# Patient Record
Sex: FEMALE | Race: WHITE | Employment: OTHER | URBAN - METROPOLITAN AREA
[De-identification: names, ages, dates, MRNs, and addresses within clinical notes are randomized per-mention and may not be internally consistent; named-entity substitution may affect disease eponyms.]

---

## 2017-05-19 ENCOUNTER — OFFICE VISIT (OUTPATIENT)
Dept: CARDIOLOGY CLINIC | Age: 67
End: 2017-05-19

## 2017-05-19 VITALS
HEIGHT: 60 IN | HEART RATE: 60 BPM | DIASTOLIC BLOOD PRESSURE: 66 MMHG | BODY MASS INDEX: 29.25 KG/M2 | OXYGEN SATURATION: 98 % | WEIGHT: 149 LBS | SYSTOLIC BLOOD PRESSURE: 104 MMHG

## 2017-05-19 DIAGNOSIS — I48.91 ATRIAL FIBRILLATION, UNSPECIFIED TYPE (HCC): Primary | ICD-10-CM

## 2017-05-19 DIAGNOSIS — I10 ESSENTIAL HYPERTENSION: ICD-10-CM

## 2017-05-19 DIAGNOSIS — I50.32 CHRONIC DIASTOLIC CONGESTIVE HEART FAILURE (HCC): ICD-10-CM

## 2017-05-19 DIAGNOSIS — E78.2 MIXED HYPERLIPIDEMIA: ICD-10-CM

## 2017-05-19 DIAGNOSIS — Z79.899 ON AMIODARONE THERAPY: ICD-10-CM

## 2017-05-19 PROCEDURE — 99215 OFFICE O/P EST HI 40 MIN: CPT | Performed by: INTERNAL MEDICINE

## 2017-05-19 PROCEDURE — 93000 ELECTROCARDIOGRAM COMPLETE: CPT | Performed by: INTERNAL MEDICINE

## 2017-05-19 RX ORDER — ALENDRONATE SODIUM 70 MG/1
70 TABLET ORAL
COMMUNITY
End: 2021-09-16 | Stop reason: ALTCHOICE

## 2017-07-24 RX ORDER — AMIODARONE HYDROCHLORIDE 200 MG/1
TABLET ORAL
Qty: 90 TABLET | Refills: 0 | Status: SHIPPED | OUTPATIENT
Start: 2017-07-24 | End: 2017-12-01 | Stop reason: ALTCHOICE

## 2017-07-24 RX ORDER — LOSARTAN POTASSIUM 100 MG/1
TABLET ORAL
Qty: 45 TABLET | Refills: 0 | Status: SHIPPED | OUTPATIENT
Start: 2017-07-24 | End: 2017-12-01 | Stop reason: SDUPTHER

## 2017-07-27 ENCOUNTER — HOSPITAL ENCOUNTER (OUTPATIENT)
Dept: PULMONOLOGY | Age: 67
Discharge: HOME | End: 2017-07-27
Attending: INTERNAL MEDICINE | Admitting: INTERNAL MEDICINE

## 2017-07-27 DIAGNOSIS — Z12.31 VISIT FOR SCREENING MAMMOGRAM: ICD-10-CM

## 2017-07-27 DIAGNOSIS — Z79.899 OTHER LONG TERM (CURRENT) DRUG THERAPY: ICD-10-CM

## 2017-07-27 PROCEDURE — 94060 EVALUATION OF WHEEZING: CPT | Performed by: INTERNAL MEDICINE

## 2017-07-27 PROCEDURE — 94727 GAS DIL/WSHOT DETER LNG VOL: CPT | Performed by: INTERNAL MEDICINE

## 2017-07-27 PROCEDURE — 94729 DIFFUSING CAPACITY: CPT | Performed by: INTERNAL MEDICINE

## 2017-07-27 RX ORDER — ALBUTEROL SULFATE 90 UG/1
4 AEROSOL, METERED RESPIRATORY (INHALATION) ONCE
Status: COMPLETED | OUTPATIENT
Start: 2017-07-27 | End: 2017-07-27

## 2017-07-27 RX ADMIN — ALBUTEROL SULFATE 4 PUFF: 90 AEROSOL, METERED RESPIRATORY (INHALATION) at 08:32

## 2017-10-16 RX ORDER — LOSARTAN POTASSIUM 100 MG/1
TABLET ORAL
Qty: 45 TABLET | Refills: 3 | Status: SHIPPED | OUTPATIENT
Start: 2017-10-16

## 2017-12-01 ENCOUNTER — OFFICE VISIT (OUTPATIENT)
Dept: CARDIOLOGY CLINIC | Age: 67
End: 2017-12-01

## 2017-12-01 VITALS
SYSTOLIC BLOOD PRESSURE: 106 MMHG | DIASTOLIC BLOOD PRESSURE: 66 MMHG | HEIGHT: 60 IN | OXYGEN SATURATION: 98 % | BODY MASS INDEX: 27.88 KG/M2 | HEART RATE: 88 BPM | WEIGHT: 142 LBS

## 2017-12-01 DIAGNOSIS — E78.2 MIXED HYPERLIPIDEMIA: ICD-10-CM

## 2017-12-01 DIAGNOSIS — I50.32 CHRONIC DIASTOLIC CONGESTIVE HEART FAILURE (HCC): ICD-10-CM

## 2017-12-01 DIAGNOSIS — I10 ESSENTIAL HYPERTENSION: Primary | ICD-10-CM

## 2017-12-01 DIAGNOSIS — I48.0 PAF (PAROXYSMAL ATRIAL FIBRILLATION) (HCC): ICD-10-CM

## 2017-12-01 PROCEDURE — 93000 ELECTROCARDIOGRAM COMPLETE: CPT | Performed by: INTERNAL MEDICINE

## 2017-12-01 PROCEDURE — 99214 OFFICE O/P EST MOD 30 MIN: CPT | Performed by: INTERNAL MEDICINE

## 2017-12-01 NOTE — PROGRESS NOTES
levothyroxine (SYNTHROID) 75 MCG tablet Take 75 mcg by mouth Daily      furosemide (LASIX) 40 MG tablet Take 40 mg by mouth 3 times daily      carvedilol (COREG) 6.25 MG tablet Take 6.25 mg by mouth 2 times daily (with meals).  potassium chloride SA (K-DUR;KLOR-CON M) 20 MEQ tablet TAKE 1 BY MOUTH AS DIRECTED EVERY DAY 90 tablet 3    spironolactone (ALDACTONE) 25 MG tablet TAKE 1 TABLET BY MOUTH EVERY DAY 90 tablet 3    nystatin (MYCOSTATIN) 573425 UNIT/ML suspension Take 500,000 Units by mouth 4 times daily.  Loratadine (CLARITIN) 10 MG CAPS Take  by mouth.  oxybutynin (DITROPAN-XL) 5 MG CR tablet Take 5 mg by mouth 3 times daily.  vitamin D (CHOLECALCIFEROL) 1000 UNIT TABS tablet Take 2,000 Units by mouth daily       ranitidine (ZANTAC) 300 MG capsule Take 300 mg by mouth 2 times daily.  atorvastatin (LIPITOR) 10 MG tablet Take 10 mg by mouth daily.  silver sulfADIAZINE (SILVADENE) 1 % cream Apply 1 applicator topically daily. Apply topically daily. No current facility-administered medications for this visit. Review of Systems:  Review of systems is as detailed above and all other systems are normal.     Physical Exam:   /66   Pulse 88   Ht 5' (1.524 m)   Wt 142 lb (64.4 kg) Comment: did not wish to remove shoes  SpO2 98%   BMI 27.73 kg/m²   Wt Readings from Last 3 Encounters:   12/01/17 142 lb (64.4 kg)   05/19/17 149 lb (67.6 kg)   12/02/16 143 lb 9.6 oz (65.1 kg)     Constitutional: She is oriented to person, place, and time. She appears well-developed and well-nourished. In no acute distress. Head: Normocephalic and atraumatic. Pupils equal and round. Neck: Neck supple. No JVP or carotid bruit appreciated. No mass and no thyromegaly present. No lymphadenopathy present. Cardiovascular:  Irregularly irregular. Exam reveals no gallop and no friction rub. No murmur heard.   Pulmonary/Chest: Effort normal and breath sounds normal. No respiratory distress. She has no wheezes, rhonchi or rales. Abdominal: Soft, non-tender. Bowel sounds are normal. She exhibits no organomegaly, mass or bruit. Extremities: No edema, cyanosis, or clubbing. Pulses are 2+ radial/dorsalis pedis/posterior tibial/carotid bilaterally. Neurological: No gross cranial nerve deficit. Coordination normal.   Skin: Skin is warm and dry. There is no rash or diaphoresis. Psychiatric: She has a normal mood and affect. Her speech is normal and behavior is normal.     Lab Review:   FLP: 7/2016 ([facility]) , TG 88, LDL 92, HDL 54  BUN/Creatinine: 7/2016  Lab Results   Component Value Date    BUN 13 04/20/2014    CREATININE 0.9 04/20/2014     EKG Interpretation: 5/19/17- S bradycardia     12/1/17-atrial fibrillation CVR  Image Review:     PFT 7/2017  Overall Interpretation  No obstruction is present  No restriction is present  There is not a bronchodilator response present  Diffusion capacity is normal  FEV1 is 2.07 L at 110% predicted. FEV1/FVC ratio is 73  Normal study.  No evidence to suggest toxicity due to amiodarone    Echo 5/2010  LVEF 50-55  Mild mitral regurgitation  Mild mitral annujlar calcification  Mild tricuspid regurgitation with normal RVSP 30mmHg    Echo 7/2007  EF 25-30%    Assessment/Plan:     Hypertension  BP is normal today. BMP from 10/2017 stable. Hyperlipidemia  Last lipid profile from 8/4/17 was WNL. Atrial fibrillation [facility])  Patient is in atrial fibrillation and this is confirmed by EKG today. I will discontinue her  of Amiodarone. Patient is on chronic anticoagulation therapy. She may switch to Xarelto or Eliquis if it is not cost prohibitive. PFT and TSH from 7/2017 was normal.     CHF (congestive heart failure) ([address]  Patient denies dyspnea on exertion, orthopnea, PND and  leg edema. Patient is euvolemic on clinical exam today. Renal profile from 10/5/2017 was within normal limits. NYHA class 2    Patient to follow up 6 months.  Pt already had her flu vaccine. Thank you very much for allowing me to participate in the care of your patient. Please do not hesitate to contact me if you have any questions.     Sincerely,  Christopher Gonzalez MD      [address]  Ph: [phone]  Fax: [phone]

## 2017-12-01 NOTE — PATIENT INSTRUCTIONS
Patient Education        Atrial Fibrillation: Care Instructions  Your Care Instructions    Atrial fibrillation is an irregular and often fast heartbeat. Treating this condition is important for several reasons. It can cause blood clots, which can travel from your heart to your brain and cause a stroke. If you have a fast heartbeat, you may feel lightheaded, dizzy, and weak. An irregular heartbeat can also increase your risk for heart failure. Atrial fibrillation is often the result of another heart condition, such as high blood pressure or coronary artery disease. Making changes to improve your heart condition will help you stay healthy and active. Follow-up care is a key part of your treatment and safety. Be sure to make and go to all appointments, and call your doctor if you are having problems. It's also a good idea to know your test results and keep a list of the medicines you take. How can you care for yourself at home? Medicines  · Take your medicines exactly as prescribed. Call your doctor if you think you are having a problem with your medicine. You will get more details on the specific medicines your doctor prescribes. · If your doctor has given you a blood thinner to prevent a stroke, be sure you get instructions about how to take your medicine safely. Blood thinners can cause serious bleeding problems. · Do not take any vitamins, over-the-counter drugs, or herbal products without talking to your doctor first.  Lifestyle changes  · Do not smoke. Smoking can increase your chance of a stroke and heart attack. If you need help quitting, talk to your doctor about stop-smoking programs and medicines. These can increase your chances of quitting for good. · Eat a heart-healthy diet. · Stay at a healthy weight. Lose weight if you need to. · Limit alcohol to 2 drinks a day for men and 1 drink a day for women. Too much alcohol can cause health problems. · Avoid colds and flu.  Get a pneumococcal vaccine include:  ¨ Sudden numbness, tingling, weakness, or loss of movement in your face, arm, or leg, especially on only one side of your body. ¨ Sudden vision changes. ¨ Sudden trouble speaking. ¨ Sudden confusion or trouble understanding simple statements. ¨ Sudden problems with walking or balance. ¨ A sudden, severe headache that is different from past headaches. · You passed out (lost consciousness). Call your doctor now or seek immediate medical care if:  · You have new or increased shortness of breath. · You feel dizzy or lightheaded, or you feel like you may faint. · Your heart rate becomes irregular. · You can feel your heart flutter in your chest or skip heartbeats. Tell your doctor if these symptoms are new or worse. Watch closely for changes in your health, and be sure to contact your doctor if you have any problems. Where can you learn more? Go to https://Ninja MetricspeNanoSteel.Fitness Interactive Experience. org and sign in to your Qu[employer] account. Enter U020 in the [facility] box to learn more about \"Atrial Fibrillation: Care Instructions. \"     If you do not have an account, please click on the \"Sign Up Now\" link. Current as of: September 21, 2016  Content Version: 11.3  © 6436-3291 [employer], viseto. Care instructions adapted under license by [facility] ([facility]). If you have questions about a medical condition or this instruction, always ask your healthcare professional. [place] [address] any warranty or liability for your use of this information.

## 2017-12-05 ENCOUNTER — TELEPHONE (OUTPATIENT)
Dept: CARDIOLOGY CLINIC | Age: 67
End: 2017-12-05

## 2017-12-05 RX ORDER — AMIODARONE HYDROCHLORIDE 200 MG/1
100 TABLET ORAL DAILY
COMMUNITY
End: 2018-06-22 | Stop reason: DRUGHIGH

## 2017-12-05 NOTE — TELEPHONE ENCOUNTER
Dr. Medhat Vazquez,     Patient was seen in office 12/1/17 and Amiodarone discontinued as per her EKG she was back in Afib CVR. She did call the on call last evening and was instructed to take 1/2 tablet(100mg). Would you like her to continue Amiodarone 100 mg daily and maybe evaluate her rate with a holter monitor? She is on no other rate control medications. Thank you.

## 2017-12-05 NOTE — TELEPHONE ENCOUNTER
Suzanne Espinoza called in stating at her OV ov 12/1 Dr. Albino Roth stopped her Amiodarone. She states she was taking Amiodarone 200 mg and cutting the tablet in half. She was off of it Saturday Sunday and took one Monday because she wasn't feeling well. She wanted to let Dr. Albino Roth know she doesn't feel comfortable discontinuing the Amiodarone. You can reach Suzanne Espinoza at [address] says she will be out this afternoon and to please leave a voicemail if she doesn't answer.

## 2017-12-05 NOTE — TELEPHONE ENCOUNTER
Patient was in office on 12/1/17. Said she did not feel good. Could not explain. Not really SOB, maybe more labored. She is not sure if she felt bad since she was off the amiodarone or if it was some kind on anxiety attack. Called office last night, spoke with Dr. Debbie Escobedo on call. He told her that she could go ahead an take the 1/2 tablet. She did not take any today. She checked her vitals today on her sister's home monitor. Left arm /86 P 86; Right arm 121/69 P 82. She states her arms vary most of the time. O2 98. She said she feels better on amiodarone, but will do whatever Dr. Petrona Stallworth wants her to do.

## 2017-12-06 NOTE — TELEPHONE ENCOUNTER
Attempted to call patient again. Left another message as requested. OK to restart amiodarone. Requested call back to confirm.

## 2018-01-08 RX ORDER — AMIODARONE HYDROCHLORIDE 200 MG/1
TABLET ORAL
Qty: 90 TABLET | Refills: 5 | Status: SHIPPED | OUTPATIENT
Start: 2018-01-08 | End: 2018-06-22 | Stop reason: ALTCHOICE

## 2018-06-22 ENCOUNTER — OFFICE VISIT (OUTPATIENT)
Dept: CARDIOLOGY CLINIC | Age: 68
End: 2018-06-22

## 2018-06-22 VITALS
HEART RATE: 88 BPM | BODY MASS INDEX: 29.56 KG/M2 | SYSTOLIC BLOOD PRESSURE: 104 MMHG | DIASTOLIC BLOOD PRESSURE: 60 MMHG | HEIGHT: 57 IN | WEIGHT: 137 LBS | OXYGEN SATURATION: 98 %

## 2018-06-22 DIAGNOSIS — E78.2 MIXED HYPERLIPIDEMIA: ICD-10-CM

## 2018-06-22 DIAGNOSIS — I48.91 ATRIAL FIBRILLATION, UNSPECIFIED TYPE (HCC): ICD-10-CM

## 2018-06-22 DIAGNOSIS — I10 ESSENTIAL HYPERTENSION: Primary | ICD-10-CM

## 2018-06-22 DIAGNOSIS — I50.22 CHRONIC SYSTOLIC CONGESTIVE HEART FAILURE (HCC): ICD-10-CM

## 2018-06-22 PROCEDURE — 99214 OFFICE O/P EST MOD 30 MIN: CPT | Performed by: INTERNAL MEDICINE

## 2018-06-22 PROCEDURE — 93000 ELECTROCARDIOGRAM COMPLETE: CPT | Performed by: INTERNAL MEDICINE

## 2018-08-25 ENCOUNTER — HOSPITAL ENCOUNTER (OUTPATIENT)
Dept: WOMENS IMAGING | Age: 68
Discharge: HOME | End: 2018-08-25
Attending: FAMILY MEDICINE | Admitting: FAMILY MEDICINE

## 2018-08-25 DIAGNOSIS — Z12.31 VISIT FOR SCREENING MAMMOGRAM: ICD-10-CM

## 2019-02-08 ENCOUNTER — OFFICE VISIT (OUTPATIENT)
Dept: CARDIOLOGY CLINIC | Age: 69
End: 2019-02-08
Payer: COMMERCIAL

## 2019-02-08 VITALS
DIASTOLIC BLOOD PRESSURE: 60 MMHG | HEIGHT: 60 IN | OXYGEN SATURATION: 99 % | SYSTOLIC BLOOD PRESSURE: 106 MMHG | WEIGHT: 129 LBS | BODY MASS INDEX: 25.32 KG/M2 | HEART RATE: 84 BPM

## 2019-02-08 DIAGNOSIS — E78.2 MIXED HYPERLIPIDEMIA: ICD-10-CM

## 2019-02-08 DIAGNOSIS — I10 ESSENTIAL HYPERTENSION: ICD-10-CM

## 2019-02-08 DIAGNOSIS — I50.9 CONGESTIVE HEART FAILURE, UNSPECIFIED HF CHRONICITY, UNSPECIFIED HEART FAILURE TYPE (HCC): ICD-10-CM

## 2019-02-08 DIAGNOSIS — I48.91 ATRIAL FIBRILLATION, UNSPECIFIED TYPE (HCC): Primary | ICD-10-CM

## 2019-02-08 PROCEDURE — 93000 ELECTROCARDIOGRAM COMPLETE: CPT | Performed by: INTERNAL MEDICINE

## 2019-02-08 PROCEDURE — 99214 OFFICE O/P EST MOD 30 MIN: CPT | Performed by: INTERNAL MEDICINE

## 2019-04-02 ENCOUNTER — TELEPHONE (OUTPATIENT)
Dept: CARDIOLOGY CLINIC | Age: 69
End: 2019-04-02

## 2019-04-02 NOTE — TELEPHONE ENCOUNTER
Spoke with Jenny Velasco and she was asking some questions about the ablation that was discussed with Dr Stephanie Patel in her last office visit. I encouraged her to make an appointment with Dr Anam Cueva in order to get all of her questions answered completely. She wants to check with her insurance first. She says that she will call back if she wants to make the appointment with Dr Anam Cueva.

## 2019-08-23 ENCOUNTER — OFFICE VISIT (OUTPATIENT)
Dept: CARDIOLOGY CLINIC | Age: 69
End: 2019-08-23
Payer: COMMERCIAL

## 2019-08-23 VITALS
HEART RATE: 74 BPM | HEIGHT: 58 IN | WEIGHT: 116 LBS | SYSTOLIC BLOOD PRESSURE: 102 MMHG | OXYGEN SATURATION: 97 % | BODY MASS INDEX: 24.35 KG/M2 | DIASTOLIC BLOOD PRESSURE: 62 MMHG

## 2019-08-23 DIAGNOSIS — E78.2 MIXED HYPERLIPIDEMIA: ICD-10-CM

## 2019-08-23 DIAGNOSIS — I48.91 ATRIAL FIBRILLATION, UNSPECIFIED TYPE (HCC): Primary | ICD-10-CM

## 2019-08-23 DIAGNOSIS — I10 ESSENTIAL HYPERTENSION: ICD-10-CM

## 2019-08-23 DIAGNOSIS — I50.9 CONGESTIVE HEART FAILURE, UNSPECIFIED HF CHRONICITY, UNSPECIFIED HEART FAILURE TYPE (HCC): ICD-10-CM

## 2019-08-23 PROCEDURE — 99214 OFFICE O/P EST MOD 30 MIN: CPT | Performed by: INTERNAL MEDICINE

## 2019-08-23 PROCEDURE — 93000 ELECTROCARDIOGRAM COMPLETE: CPT | Performed by: INTERNAL MEDICINE

## 2019-08-23 RX ORDER — WARFARIN SODIUM 2.5 MG/1
2.5 TABLET ORAL DAILY
COMMUNITY

## 2020-03-12 NOTE — PROGRESS NOTES
[address]      Cardiology Follow Up    Kevin Thomas  1950 March 13, 2020    CC \"I am doing good. \"    HPI:  The patient is 78 y.o. female with a past medical history significant for Afib, CHF and HTN. Today, she is here for management of her atrial fibrillation. She says that she is feeling good. She is trying to stay as active as she can. Patient denies exertional chest pain/pressure, dyspnea at rest, BATISTA, PND, orthopnea, palpitations, lightheadedness, weight changes, changes in LE edema, and syncope. Patient reports compliance to her medications.     Past Medical History:   Diagnosis Date    [facility]     Cardiomyopathy [facility]     Hyperlipidemia     Hypertension     Osteoarthritis      Past Surgical History:   Procedure Laterality Date    CHOLECYSTECTOMY      HERNIA REPAIR       Family History   Problem Relation Age of Onset    High Blood Pressure Father      Social History     Tobacco Use    Smoking status: Never Smoker    Smokeless tobacco: Never Used   Substance Use Topics    Alcohol use: No    Drug use: No       Allergies   Allergen Reactions    Latex     Plasticized Base [Plastibase]     Adhesive Tape     Erythromycin     Merthiolate Glycerite [Thimerosal]     Sulfamethoxazole-Trimethoprim Rash     Oral thrush     Current Outpatient Medications   Medication Sig Dispense Refill    warfarin (COUMADIN) 2.5 MG tablet Take 2.5 mg by mouth Twice a week per PCP Dr. Kyara Ruiz      losartan (COZAAR) 100 MG tablet TAKE ONE-HALF TABLET BY MOUTH EVERY DAY 45 tablet 3    alendronate (FOSAMAX) 70 MG tablet Take 70 mg by mouth every 7 days      Bisacodyl (LAXATIVE PO) Take by mouth nightly as needed      warfarin (COUMADIN) 3 MG tablet Take by mouth daily Five times a week      levothyroxine (SYNTHROID) 75 MCG tablet Take 75 mcg by mouth Daily      furosemide (LASIX) 40 MG tablet Take 40 mg by mouth 3 times daily      carvedilol (COREG) 6.25 MG gross cranial nerve deficit. Coordination normal.   Skin: Skin is warm and dry. There is no rash or diaphoresis. Psychiatric: Patient has a normal mood and affect. Speech is normal and behavior is normal.     Lab Review:   BMP:    Lab Results   Component Value Date     04/20/2014    K 3.8 04/20/2014     04/20/2014    CO2 24 04/20/2014    BUN 13 04/20/2014    LABALBU 4.1 04/20/2014    CREATININE 0.9 04/20/2014    CALCIUM 9.5 04/20/2014    GFRAA >60 04/20/2014    LABGLOM >60 04/20/2014    GLUCOSE 98 04/20/2014     FLP:  No results found for: TRIG, HDL, LDLCALC, LDLDIRECT, LABVLDL      EKG Interpretation: 8/23/19 Atrial fibrillation      Image Review:     PFT 7/2017  Overall Interpretation  No obstruction is present  No restriction is present  There is not a bronchodilator response present  Diffusion capacity is normal  FEV1 is 2.07 L at 110% predicted. FEV1/FVC ratio is 73  Normal study.  No evidence to suggest toxicity due to amiodarone    Echo 5/2010  LVEF 50-55  Mild mitral regurgitation  Mild mitral annujlar calcification  Mild tricuspid regurgitation with normal RVSP 30mmHg    Echo 7/2007  EF 25-30%    Assessment/Plan:     Hypertension  Blood pressure is stable. BMP from 8/2019 stable. Hyperlipidemia  Last lipid profile from 8/9/19 was WNL. Continue Lipitor 10 mg daily. She will get her lipid panel repeat at her PCP in April. Atrial fibrillation ([address]  EKG shows controlled atrial fibrillation today. She remains asymptomatic. Patient is on chronic anticoagulation therapy. INR managed by Dr. Flavia Thomas. CHF (congestive heart failure) ([address]  She denies shortness of breath, orthopnea, or PND. Renal profile from 8/2019 was within normal limits. NYHA class 2    Follow up in 6 months. She had her flu vaccine this season. Thank you very much for allowing me to participate in the care of your patient. Please do not hesitate to contact me if you have any questions.     This note was scribed in the presence of Dr Cliff Osullivan, by Luci Stewart RN  Physician Attestation:  The scribes documentation has been prepared under my direction and personally reviewed by me in its entirety. I confirm that the note above accurately reflects all work, treatment, procedures, and medical decision making performed by me.     Sincerely,  Fanny Odom MD      [address]   [address] [place] [address]  Ph: [phone]  Fax: [phone]

## 2020-03-13 ENCOUNTER — OFFICE VISIT (OUTPATIENT)
Dept: CARDIOLOGY CLINIC | Age: 70
End: 2020-03-13
Payer: COMMERCIAL

## 2020-03-13 VITALS
SYSTOLIC BLOOD PRESSURE: 110 MMHG | WEIGHT: 118 LBS | HEART RATE: 86 BPM | BODY MASS INDEX: 23.16 KG/M2 | OXYGEN SATURATION: 97 % | DIASTOLIC BLOOD PRESSURE: 64 MMHG | HEIGHT: 60 IN

## 2020-03-13 PROCEDURE — 99214 OFFICE O/P EST MOD 30 MIN: CPT | Performed by: INTERNAL MEDICINE

## 2020-03-13 PROCEDURE — 93000 ELECTROCARDIOGRAM COMPLETE: CPT | Performed by: INTERNAL MEDICINE

## 2020-07-28 ENCOUNTER — HOSPITAL ENCOUNTER (OUTPATIENT)
Dept: WOMENS IMAGING | Age: 70
Discharge: HOME | End: 2020-07-28
Payer: MEDICARE

## 2020-07-28 PROCEDURE — 77067 SCR MAMMO BI INCL CAD: CPT

## 2020-09-25 ENCOUNTER — OFFICE VISIT (OUTPATIENT)
Dept: CARDIOLOGY CLINIC | Age: 70
End: 2020-09-25
Payer: MEDICARE

## 2020-09-25 VITALS
BODY MASS INDEX: 23.39 KG/M2 | WEIGHT: 116 LBS | OXYGEN SATURATION: 95 % | SYSTOLIC BLOOD PRESSURE: 108 MMHG | DIASTOLIC BLOOD PRESSURE: 68 MMHG | HEART RATE: 84 BPM | TEMPERATURE: 98.2 F | HEIGHT: 59 IN

## 2020-09-25 PROCEDURE — 93000 ELECTROCARDIOGRAM COMPLETE: CPT | Performed by: INTERNAL MEDICINE

## 2020-09-25 PROCEDURE — 99214 OFFICE O/P EST MOD 30 MIN: CPT | Performed by: INTERNAL MEDICINE

## 2020-09-25 NOTE — PATIENT INSTRUCTIONS
Patient Education        Atrial Fibrillation: Care Instructions  Your Care Instructions     Atrial fibrillation is an irregular and often fast heartbeat. Treating this condition is important for several reasons. It can cause blood clots, which can travel from your heart to your brain and cause a stroke. If you have a fast heartbeat, you may feel lightheaded, dizzy, and weak. An irregular heartbeat can also increase your risk for heart failure. Atrial fibrillation is often the result of another heart condition, such as high blood pressure or coronary artery disease. Making changes to improve your heart condition will help you stay healthy and active. Follow-up care is a key part of your treatment and safety. Be sure to make and go to all appointments, and call your doctor if you are having problems. It's also a good idea to know your test results and keep a list of the medicines you take. How can you care for yourself at home? Medicines  · Take your medicines exactly as prescribed. Call your doctor if you think you are having a problem with your medicine. You will get more details on the specific medicines your doctor prescribes. · If your doctor has given you a blood thinner to prevent a stroke, be sure you get instructions about how to take your medicine safely. Blood thinners can cause serious bleeding problems. · Do not take any vitamins, over-the-counter drugs, or herbal products without talking to your doctor first.  Lifestyle changes  · Do not smoke. Smoking can increase your chance of a stroke and heart attack. If you need help quitting, talk to your doctor about stop-smoking programs and medicines. These can increase your chances of quitting for good. · Eat a heart-healthy diet. · Stay at a healthy weight. Lose weight if you need to. · Limit alcohol to 2 drinks a day for men and 1 drink a day for women. Too much alcohol can cause health problems. · Avoid colds and flu.  Get a pneumococcal vaccine shot. If you have had one before, ask your doctor whether you need another dose. Get a flu shot every year. If you must be around people with colds or flu, wash your hands often. Activity  · If your doctor recommends it, get more exercise. Walking is a good choice. Bit by bit, increase the amount you walk every day. Try for at least 30 minutes on most days of the week. You also may want to swim, bike, or do other activities. Your doctor may suggest that you join a cardiac rehabilitation program so that you can have help increasing your physical activity safely. · Start light exercise if your doctor says it is okay. Even a small amount will help you get stronger, have more energy, and manage stress. Walking is an easy way to get exercise. Start out by walking a little more than you did in the hospital. Gradually increase the amount you walk. · When you exercise, watch for signs that your heart is working too hard. You are pushing too hard if you cannot talk while you are exercising. If you become short of breath or dizzy or have chest pain, sit down and rest immediately. · Check your pulse regularly. Place two fingers on the artery at the palm side of your wrist, in line with your thumb. If your heartbeat seems uneven or fast, talk to your doctor. When should you call for help? [id number] anytime you think you may need emergency care. For example, call if:  · You have symptoms of a heart attack. These may include:  ? Chest pain or pressure, or a strange feeling in the chest.  ? Sweating. ? Shortness of breath. ? Nausea or vomiting. ? Pain, pressure, or a strange feeling in the back, neck, jaw, or upper belly or in one or both shoulders or arms. ? Lightheadedness or sudden weakness. ? A fast or irregular heartbeat. After you call 911, the  may tell you to chew 1 adult-strength or 2 to 4 low-dose aspirin. Wait for an ambulance. Do not try to drive yourself. · You have symptoms of a stroke.  These may include:  ? Sudden numbness, tingling, weakness, or loss of movement in your face, arm, or leg, especially on only one side of your body. ? Sudden vision changes. ? Sudden trouble speaking. ? Sudden confusion or trouble understanding simple statements. ? Sudden problems with walking or balance. ? A sudden, severe headache that is different from past headaches. · You passed out (lost consciousness). Call your doctor now or seek immediate medical care if:  · You have new or increased shortness of breath. · You feel dizzy or lightheaded, or you feel like you may faint. · Your heart rate becomes irregular. · You can feel your heart flutter in your chest or skip heartbeats. Tell your doctor if these symptoms are new or worse. Watch closely for changes in your health, and be sure to contact your doctor if you have any problems. Where can you learn more? Go to https://Ruralco HoldingspeOnlineprintersewUnveil.Qiyou Interaction Network. org and sign in to your [employer] account. Enter U020 in the [employer] box to learn more about \"Atrial Fibrillation: Care Instructions. \"     If you do not have an account, please click on the \"Sign Up Now\" link. Current as of: December 16, 2019               Content Version: 12.5  © 1417-4055 Healthwise, Incorporated. Care instructions adapted under license by [facility] ([facility]). If you have questions about a medical condition or this instruction, always ask your healthcare professional. [place] [address] any warranty or liability for your use of this information.

## 2020-09-25 NOTE — PROGRESS NOTES
[address]      Cardiology Follow Up    Gretchen Ellington  1950 September 25, 2020    CC \"I am feeling good. \"    HPI:  The patient is 69 y.o. female with a past medical history significant for Afib, CHF and HTN. Today, she is here for routine follow up. She says that she has been feeling good. She recently had labs done at her PCP. She continues to stay active without limitations. Patient denies exertional chest pain/pressure, dyspnea at rest, BATISTA, PND, orthopnea, palpitations, lightheadedness, weight changes, changes in LE edema, and syncope.       Past Medical History:   Diagnosis Date    Atrial fibrillation ([address]     Cardiomyopathy [facility] [address] [facility]     Hyperlipidemia     Hypertension     Osteoarthritis      Past Surgical History:   Procedure Laterality Date    CHOLECYSTECTOMY      HERNIA REPAIR       Family History   Problem Relation Age of Onset    High Blood Pressure Father      Social History     Tobacco Use    Smoking status: Never Smoker    Smokeless tobacco: Never Used   Substance Use Topics    Alcohol use: No    Drug use: No       Allergies   Allergen Reactions    Latex     Plasticized Base [Plastibase]     Adhesive Tape     Erythromycin     Merthiolate Glycerite [Thimerosal]     Sulfamethoxazole-Trimethoprim Rash     Oral thrush     Current Outpatient Medications   Medication Sig Dispense Refill    FAMOTIDINE PO Take by mouth 2 times daily      Acetaminophen (TYLENOL PO) Take by mouth as needed      warfarin (COUMADIN) 2.5 MG tablet Take 2.5 mg by mouth Twice a week per PCP Dr. Enrique Antonio      losartan (COZAAR) 100 MG tablet TAKE ONE-HALF TABLET BY MOUTH EVERY DAY 45 tablet 3    alendronate (FOSAMAX) 70 MG tablet Take 70 mg by mouth every 7 days      Bisacodyl (LAXATIVE PO) Take by mouth nightly as needed      warfarin (COUMADIN) 3 MG tablet Take by mouth daily Five times a week      levothyroxine (SYNTHROID) 75 MCG tablet Take 75 mcg by mouth Daily      furosemide (LASIX) 40 MG tablet Take 40 mg by mouth 3 times daily      carvedilol (COREG) 6.25 MG tablet Take 6.25 mg by mouth 2 times daily (with meals).  potassium chloride SA (K-DUR;KLOR-CON M) 20 MEQ tablet TAKE 1 BY MOUTH AS DIRECTED EVERY DAY 90 tablet 3    spironolactone (ALDACTONE) 25 MG tablet TAKE 1 TABLET BY MOUTH EVERY DAY 90 tablet 3    Loratadine (CLARITIN) 10 MG CAPS Take by mouth daily       oxybutynin (DITROPAN-XL) 5 MG CR tablet Take 5 mg by mouth 3 times daily.  vitamin D (CHOLECALCIFEROL) 1000 UNIT TABS tablet Take 2,000 Units by mouth daily       atorvastatin (LIPITOR) 10 MG tablet Take 10 mg by mouth daily.  silver sulfADIAZINE (SILVADENE) 1 % cream Apply 1 applicator topically daily. Apply topically daily. No current facility-administered medications for this visit. Review of Systems:  Review of systems is as detailed above and all other systems are normal.     Physical Exam:   /68   Pulse 84   Temp 98.2 °F (36.8 °C)   Ht 4' 11\" (1.499 m)   Wt 116 lb (52.6 kg) Comment: with shoes  SpO2 95%   BMI 23.43 kg/m²   Wt Readings from Last 3 Encounters:   09/25/20 116 lb (52.6 kg)   03/13/20 118 lb (53.5 kg)   08/23/19 116 lb (52.6 kg)     Constitutional: The patient is oriented to person, place, and time. Appears well-developed and well-nourished. In no acute distress. Head: Normocephalic and atraumatic. Pupils equal and round. Neck: Neck supple. No JVP or carotid bruit appreciated. No mass and no thyromegaly present. No lymphadenopathy present. Cardiovascular: Irregularly irregular. Normal heart sounds. Exam reveals no gallop and no friction rub. No murmur heard. Pulmonary/Chest: Effort normal and breath sounds normal. No respiratory distress. No wheezes, rhonchi or rales. Abdominal: Soft, non-tender. Bowel sounds are normal. Exhibits no organomegaly, mass or bruit. Extremities: No edema. No cyanosis or clubbing.  Pulses are 2+ radial and carotid bilaterally. Neurological: No gross cranial nerve deficit. Coordination normal.   Skin: Skin is warm and dry. There is no rash or diaphoresis. Psychiatric: Patient has a normal mood and affect. Speech is normal and behavior is normal.     Lab Review:   BMP:    Lab Results   Component Value Date     04/20/2014    K 3.8 04/20/2014     04/20/2014    CO2 24 04/20/2014    BUN 13 04/20/2014    LABALBU 4.1 04/20/2014    CREATININE 0.9 04/20/2014    CALCIUM 9.5 04/20/2014    GFRAA >60 04/20/2014    LABGLOM >60 04/20/2014    GLUCOSE 98 04/20/2014     FLP:  No results found for: TRIG, HDL, LDLCALC, LDLDIRECT, LABVLDL      EKG Interpretation: 8/23/19 Atrial fibrillation     9/25/2020 Atrial fibrillation      Image Review:     PFT 7/2017  Overall Interpretation  No obstruction is present  No restriction is present  There is not a bronchodilator response present  Diffusion capacity is normal  FEV1 is 2.07 L at 110% predicted. FEV1/FVC ratio is 73  Normal study.  No evidence to suggest toxicity due to amiodarone    Echo 5/2010  LVEF 50-55  Mild mitral regurgitation  Mild mitral annujlar calcification  Mild tricuspid regurgitation with normal RVSP 30mmHg    Echo 7/2007  EF 25-30%    Assessment/Plan:     Hypertension  Controlled. BMP from 9/2020 stable. Hyperlipidemia  Last lipid profile from 9/18/2020 was WNL. Continue Lipitor 10 mg daily. Atrial fibrillation (HCC)  EKG reveals controlled atrial fib. Asymptomatic. Patient is on chronic anticoagulation therapy. INR managed by Dr. Portia Mcgraw. I discussed with her the option of an ablation. She continues to continue her current treatment with Warfarin at this time. She will call the office if she wishes to proceed. CHF (congestive heart failure) (HCC)  Appears compensated on exShe denies shortness of breath, orthopnea, or PND. Continue ARB and B-blocker. NYHA class 2    Follow up in 6 months. I have advised her to get annual flu vaccine. Complexity of medical decision making-high    Thank you very much for allowing me to participate in the care of your patient. Please do not hesitate to contact me if you have any questions. Sincerely,  Edwin Ruff MD      [facility], [address]  Ph: [phone]  Fax: [phone]    This note was scribed in the presence of Dr Vane Johnson, by Lindsay Delgado RN  Physician Attestation:  The scribes documentation has been prepared under my direction and personally reviewed by me in its entirety. I confirm that the note above accurately reflects all work, treatment, procedures, and medical decision making performed by me.

## 2021-03-18 ENCOUNTER — OFFICE VISIT (OUTPATIENT)
Dept: CARDIOLOGY CLINIC | Age: 71
End: 2021-03-18
Payer: MEDICARE

## 2021-03-18 VITALS
WEIGHT: 120 LBS | BODY MASS INDEX: 25.19 KG/M2 | SYSTOLIC BLOOD PRESSURE: 114 MMHG | HEART RATE: 84 BPM | TEMPERATURE: 97.8 F | DIASTOLIC BLOOD PRESSURE: 66 MMHG | HEIGHT: 58 IN

## 2021-03-18 DIAGNOSIS — I50.9 CHRONIC HEART FAILURE, UNSPECIFIED HEART FAILURE TYPE (HCC): ICD-10-CM

## 2021-03-18 DIAGNOSIS — I48.0 PAF (PAROXYSMAL ATRIAL FIBRILLATION) (HCC): ICD-10-CM

## 2021-03-18 DIAGNOSIS — I10 ESSENTIAL HYPERTENSION: Primary | ICD-10-CM

## 2021-03-18 DIAGNOSIS — E78.5 HYPERLIPIDEMIA, UNSPECIFIED HYPERLIPIDEMIA TYPE: ICD-10-CM

## 2021-03-18 PROCEDURE — 99213 OFFICE O/P EST LOW 20 MIN: CPT | Performed by: INTERNAL MEDICINE

## 2021-03-18 PROCEDURE — 93000 ELECTROCARDIOGRAM COMPLETE: CPT | Performed by: INTERNAL MEDICINE

## 2021-03-18 NOTE — PROGRESS NOTES
[address]      Cardiology Follow Up    Brit Me  1950 March 18, 2021    CC \"I am feeling good with no heart symptoms. \"    HPI:  The patient is 69 y.o. female with a past medical history significant for Afib, CHF and HTN. Today, she denies any new cardiac sounding symptoms today. She has obtain the COVID vaccine. Patient denies exertional chest pain/pressure, dyspnea at rest, BATISTA, PND, orthopnea, palpitations, lightheadedness, weight changes, changes in LE edema, and syncope. She reports medical therapy compliance and tolerating. She denies any abnormal bruising or bleeding. Using a cane for walking aid.        Past Medical History:   Diagnosis Date    Atrial fibrillation ([address]     Cardiomyopathy ([facility] [address]     Hyperlipidemia     Hypertension     Osteoarthritis      Past Surgical History:   Procedure Laterality Date    CHOLECYSTECTOMY      HERNIA REPAIR       Family History   Problem Relation Age of Onset    High Blood Pressure Father      Social History     Tobacco Use    Smoking status: Never Smoker    Smokeless tobacco: Never Used   Substance Use Topics    Alcohol use: No    Drug use: No       Allergies   Allergen Reactions    Latex     Plasticized Base [Plastibase]     Adhesive Tape     Erythromycin     Merthiolate Glycerite [Thimerosal]     Sulfamethoxazole-Trimethoprim Rash     Oral thrush     Current Outpatient Medications   Medication Sig Dispense Refill    FAMOTIDINE PO Take by mouth 2 times daily      Acetaminophen (TYLENOL PO) Take by mouth as needed      warfarin (COUMADIN) 2.5 MG tablet Take 2.5 mg by mouth Twice a week per PCP Dr. Pravin Leung      losartan (COZAAR) 100 MG tablet TAKE ONE-HALF TABLET BY MOUTH EVERY DAY 45 tablet 3    alendronate (FOSAMAX) 70 MG tablet Take 70 mg by mouth every 7 days      Bisacodyl (LAXATIVE PO) Take by mouth nightly as needed      warfarin (COUMADIN) 3 MG tablet Take by mouth daily Five times a week      levothyroxine (SYNTHROID) 75 MCG tablet Take 75 mcg by mouth Daily      furosemide (LASIX) 40 MG tablet Take 40 mg by mouth 3 times daily      carvedilol (COREG) 6.25 MG tablet Take 6.25 mg by mouth 2 times daily (with meals).  potassium chloride SA (K-DUR;KLOR-CON M) 20 MEQ tablet TAKE 1 BY MOUTH AS DIRECTED EVERY DAY 90 tablet 3    spironolactone (ALDACTONE) 25 MG tablet TAKE 1 TABLET BY MOUTH EVERY DAY 90 tablet 3    Loratadine (CLARITIN) 10 MG CAPS Take by mouth daily       oxybutynin (DITROPAN-XL) 5 MG CR tablet Take 5 mg by mouth 3 times daily.  vitamin D (CHOLECALCIFEROL) 1000 UNIT TABS tablet Take 2,000 Units by mouth daily       atorvastatin (LIPITOR) 10 MG tablet Take 10 mg by mouth daily.  silver sulfADIAZINE (SILVADENE) 1 % cream Apply 1 applicator topically daily. Apply topically daily. No current facility-administered medications for this visit. Review of Systems:  Review of systems is as detailed above and all other systems are normal.     Physical Exam:   /66   Pulse 84 Comment: irreg  Temp 97.8 °F (36.6 °C)   Ht 4' 10\" (1.473 m)   Wt 120 lb (54.4 kg) Comment: with shoes  BMI 25.08 kg/m²   Wt Readings from Last 3 Encounters:   03/18/21 120 lb (54.4 kg)   09/25/20 116 lb (52.6 kg)   03/13/20 118 lb (53.5 kg)     Constitutional: The patient is oriented to person, place, and time. Appears well-developed and well-nourished. In no acute distress. Head: Normocephalic and atraumatic. Pupils equal and round. Neck: Neck supple. No JVP or carotid bruit appreciated. No mass and no thyromegaly present. No lymphadenopathy present. Cardiovascular: Irregularly irregular. Normal heart sounds. Exam reveals no gallop and no friction rub. No murmur heard. Pulmonary/Chest: Effort normal and breath sounds normal. No respiratory distress. No wheezes, rhonchi or rales. Abdominal: Soft, non-tender. Bowel sounds are normal. Exhibits no organomegaly, mass or bruit. Extremities: No edema. No cyanosis or clubbing. Pulses are 2+ radial and carotid bilaterally. Neurological: No gross cranial nerve deficit. Coordination normal.   Skin: Skin is warm and dry. There is no rash or diaphoresis. Psychiatric: Patient has a normal mood and affect. Speech is normal and behavior is normal.     Lab Review:    CareEverywhere 3/2021, 9/2020 [facility]  Lab Results   Component Value Date     04/20/2014    K 3.8 04/20/2014     04/20/2014    CO2 24 04/20/2014    BUN 13 04/20/2014    LABALBU 4.1 04/20/2014    CREATININE 0.9 04/20/2014    CALCIUM 9.5 04/20/2014    GFRAA >60 04/20/2014    LABGLOM >60 04/20/2014    GLUCOSE 98 04/20/2014       No results found for: TRIG, HDL, LDLCALC, LDLDIRECT, LABVLDL      EKG Interpretation: 8/23/19 Atrial fibrillation     9/25/2020 Atrial fibrillation     3/18/21: Atrial fibrillation with RSR(V1) -nondiagnostic. Nonspecific T-abnormality. Image Review:     PFT 7/2017  Overall Interpretation  No obstruction is present  No restriction is present  There is not a bronchodilator response present  Diffusion capacity is normal  FEV1 is 2.07 L at 110% predicted. FEV1/FVC ratio is 73  Normal study.  No evidence to suggest toxicity due to amiodarone    Echo 5/2010  LVEF 50-55  Mild mitral regurgitation  Mild mitral annujlar calcification  Mild tricuspid regurgitation with normal RVSP 30mmHg    Echo 7/2007  EF 25-30%    Assessment/Plan:     Hypertension  Controlled today. BMP from 9/2020 stable. Hyperlipidemia  Stable with no reported myalgias. Last lipid profile from 9/18/2020 was WNL. Continue Lipitor 10 mg daily. Atrial fibrillation (HCC)  EKG reveals controlled atrial fib. She remains asymptomatic and has remained stable. Patient is on chronic anticoagulation therapy. INR managed by Dr. Maria Del Carmen Guerra. I had previously discussed with her the option of an ablation but she continues to refuse.  She continues to continue her current treatment with Warfarin at this time. She will call the office if she wishes to proceed. CHF (congestive heart failure)  Appears compensated on physical exam and denies denies shortness of breath, orthopnea, or PND. Continue ARB and B-blocker. On lasix therapy. NYHA class 2    She will follow up with Dr. Snehal Gaspar next week with routine blood work. Follow up in 6 months. Thank you very much for allowing me to participate in the care of your patient. Please do not hesitate to contact me if you have any questions. Sincerely,  Yao Cooper MD    This note was scribed in the presence of Dr. Heaven Granados MD by Marycarmen Cerda RN.       [facility] [address] [place] [address]  Ph: [phone]  Fax: [phone]

## 2021-03-18 NOTE — PATIENT INSTRUCTIONS
Patient Education        Atrial Fibrillation: Care Instructions  Your Care Instructions     Atrial fibrillation is an irregular and often fast heartbeat. Treating this condition is important for several reasons. It can cause blood clots, which can travel from your heart to your brain and cause a stroke. If you have a fast heartbeat, you may feel lightheaded, dizzy, and weak. An irregular heartbeat can also increase your risk for heart failure. Atrial fibrillation is often the result of another heart condition, such as high blood pressure or coronary artery disease. Making changes to improve your heart condition will help you stay healthy and active. Follow-up care is a key part of your treatment and safety. Be sure to make and go to all appointments, and call your doctor if you are having problems. It's also a good idea to know your test results and keep a list of the medicines you take. How can you care for yourself at home? Medicines    · Take your medicines exactly as prescribed. Call your doctor if you think you are having a problem with your medicine. You will get more details on the specific medicines your doctor prescribes.     · If your doctor has given you a blood thinner to prevent a stroke, be sure you get instructions about how to take your medicine safely. Blood thinners can cause serious bleeding problems.     · Do not take any vitamins, over-the-counter drugs, or herbal products without talking to your doctor first.   Lifestyle changes    · Do not smoke. Smoking can increase your chance of a stroke and heart attack. If you need help quitting, talk to your doctor about stop-smoking programs and medicines. These can increase your chances of quitting for good.     · Eat a heart-healthy diet.     · Stay at a healthy weight. Lose weight if you need to.     · Limit alcohol to 2 drinks a day for men and 1 drink a day for women. Too much alcohol can cause health problems.     · Avoid colds and flu.  Get a pneumococcal vaccine shot. If you have had one before, ask your doctor whether you need another dose. Get a flu shot every year. If you must be around people with colds or flu, wash your hands often. Activity    · If your doctor recommends it, get more exercise. Walking is a good choice. Bit by bit, increase the amount you walk every day. Try for at least 30 minutes on most days of the week. You also may want to swim, bike, or do other activities. Your doctor may suggest that you join a cardiac rehabilitation program so that you can have help increasing your physical activity safely.     · Start light exercise if your doctor says it is okay. Even a small amount will help you get stronger, have more energy, and manage stress. Walking is an easy way to get exercise. Start out by walking a little more than you did in the hospital. Gradually increase the amount you walk.     · When you exercise, watch for signs that your heart is working too hard. You are pushing too hard if you cannot talk while you are exercising. If you become short of breath or dizzy or have chest pain, sit down and rest immediately.     · Check your pulse regularly. Place two fingers on the artery at the palm side of your wrist, in line with your thumb. If your heartbeat seems uneven or fast, talk to your doctor. When should you call for help? Call 911 anytime you think you may need emergency care. For example, call if:    · You have symptoms of a heart attack. These may include:  ? Chest pain or pressure, or a strange feeling in the chest.  ? Sweating. ? Shortness of breath. ? Nausea or vomiting. ? Pain, pressure, or a strange feeling in the back, neck, jaw, or upper belly or in one or both shoulders or arms. ? Lightheadedness or sudden weakness. ? A fast or irregular heartbeat. After you call 911, the  may tell you to chew 1 adult-strength or 2 to 4 low-dose aspirin. Wait for an ambulance.  Do not try to drive yourself.     · You have symptoms of a stroke. These may include:  ? Sudden numbness, tingling, weakness, or loss of movement in your face, arm, or leg, especially on only one side of your body. ? Sudden vision changes. ? Sudden trouble speaking. ? Sudden confusion or trouble understanding simple statements. ? Sudden problems with walking or balance. ? A sudden, severe headache that is different from past headaches.     · You passed out (lost consciousness). Call your doctor now or seek immediate medical care if:    · You have new or increased shortness of breath.     · You feel dizzy or lightheaded, or you feel like you may faint.     · Your heart rate becomes irregular.     · You can feel your heart flutter in your chest or skip heartbeats. Tell your doctor if these symptoms are new or worse. Watch closely for changes in your health, and be sure to contact your doctor if you have any problems. Where can you learn more? Go to https://GoodBelly.Diagnosoft. org and sign in to your [employer] account. Enter U020 in the Indexing box to learn more about \"Atrial Fibrillation: Care Instructions. \"     If you do not have an account, please click on the \"Sign Up Now\" link. Current as of: August 31, 2020               Content Version: 12.8  © 2006-2021 [employer]. Care instructions adapted under license by [facility] ([facility]). If you have questions about a medical condition or this instruction, always ask your healthcare professional. [place] [address] any warranty or liability for your use of this information. Patient Education        Avoiding Triggers With Heart Failure: Care Instructions  Your Care Instructions     Triggers are anything that make your heart failure flare up. A flare-up is also called \"sudden heart failure\" or \"acute heart failure. \" When you have a flare-up, fluid builds up in your lungs, and you have problems breathing.  You might need to go to the hospital. By watching for changes in your condition and avoiding triggers, you can prevent heart failure flare-ups. Follow-up care is a key part of your treatment and safety. Be sure to make and go to all appointments, and call your doctor if you are having problems. It's also a good idea to know your test results and keep a list of the medicines you take. How can you care for yourself at home? Watch for changes in your weight and condition  · Weigh yourself without clothing at the same time each day. Record your weight. Call your doctor if you have sudden weight gain, such as more than 2 to 3 pounds in a day or 5 pounds in a week. (Your doctor may suggest a different range of weight gain.) A sudden weight gain may mean that your heart failure is getting worse. · Keep a daily record of your symptoms. Write down any changes in how you feel, such as new shortness of breath, cough, or problems eating. Also record if your ankles are more swollen than usual and if you feel more tired than usual. Note anything that you ate or did that could have triggered these changes. Limit sodium  Sodium causes your body to hold on to extra water. This may cause your heart failure symptoms to get worse. People get most of their sodium from processed foods. Fast food and restaurant meals also tend to be very high in sodium. · Your doctor may suggest that you limit sodium. Your doctor can tell you how much sodium is right for you. This includes limiting sodium in cooked and packaged foods. · Read food labels on cans and food packages. They tell you how much sodium you get in one serving. Check the serving size. If you eat more than one serving, you are getting more sodium. · Be aware that sodium can come in forms other than salt, including monosodium glutamate (MSG), sodium citrate, and sodium bicarbonate (baking soda). MSG is often added to Asian food. You can sometimes ask for food without MSG or salt.   · Slowly reducing salt will help other things, such as eating a meal or getting ready for bed. This will make it easier to remember to take your medicines. · Get organized. Use helpful tools, such as daily or weekly pill containers. When should you call for help? Call 911 if you have symptoms of sudden heart failure such as:    · You have severe trouble breathing.     · You cough up pink, foamy mucus.     · You have a new irregular or rapid heartbeat. Call your doctor now or seek immediate medical care if:    · You have new or increased shortness of breath.     · You are dizzy or lightheaded, or you feel like you may faint.     · You have sudden weight gain, such as more than 2 to 3 pounds in a day or 5 pounds in a week. (Your doctor may suggest a different range of weight gain.)     · You have increased swelling in your legs, ankles, or feet.     · You are suddenly so tired or weak that you cannot do your usual activities. Watch closely for changes in your health, and be sure to contact your doctor if you develop new symptoms. Where can you learn more? Go to https://Mobiveil.[employer]. org and sign in to your [employer] account. Enter R672 in the [employer] Education box to learn more about \"Avoiding Triggers With Heart Failure: Care Instructions. \"     If you do not have an account, please click on the \"Sign Up Now\" link. Current as of: August 31, 2020               Content Version: 12.8  © 8375-6715 [employer], [employer]. Care instructions adapted under license by [facility] ([facility]). If you have questions about a medical condition or this instruction, always ask your healthcare professional. [address] any warranty or liability for your use of this information.

## 2021-09-09 NOTE — PROGRESS NOTES
[facility]      Cardiology Progress Note    Brigid Garner  1950 September 16, 2021    CC \"I have no heart symptoms. \"     HPI:  The patient is 66 y.o. female with a past medical history significant for Afib, CHF and HTN. Today, she currently denies any cardiac sounding complaints. Patient denies exertional chest pain/pressure, dyspnea at rest, BATISTA, PND, orthopnea, palpitations, lightheadedness, weight changes, changes in LE edema, and syncope. She does complain of being [place]. Recent labs completed 9/10/21. The patient admits to medical therapy compliance and continues to tolerate. Her INR continues to be managed per Dr. Graciela Nieves.      Past Medical History:   Diagnosis Date    [facility] ([facility] [address] [facility]     Cardiomyopathy ([facility] [address] [facility]     Hyperlipidemia     Hypertension     Osteoarthritis      Past Surgical History:   Procedure Laterality Date    CHOLECYSTECTOMY      HERNIA REPAIR       Family History   Problem Relation Age of Onset    High Blood Pressure Father      Social History     Tobacco Use    Smoking status: Never Smoker    Smokeless tobacco: Never Used   Substance Use Topics    Alcohol use: No    Drug use: No       Allergies   Allergen Reactions    Latex     Plasticized Base [Plastibase]     Adhesive Tape     Erythromycin     Merthiolate Glycerite [Thimerosal]     Sulfamethoxazole-Trimethoprim Rash     Oral thrush     Current Outpatient Medications   Medication Sig Dispense Refill    FAMOTIDINE PO Take by mouth 2 times daily      Acetaminophen (TYLENOL PO) Take by mouth as needed      warfarin (COUMADIN) 2.5 MG tablet Take 2.5 mg by mouth daily Twice a week per PCP Dr. Graciela Nieves       losartan (COZAAR) 100 MG tablet TAKE ONE-HALF TABLET BY MOUTH EVERY DAY 45 tablet 3    Bisacodyl (LAXATIVE PO) Take by mouth nightly as needed      levothyroxine (SYNTHROID) 75 MCG tablet Take 75 mcg by mouth Daily      furosemide (LASIX) 40 MG tablet Take 40 mg by mouth 3 times daily      carvedilol (COREG) 6.25 MG tablet Take 6.25 mg by mouth 2 times daily (with meals).  potassium chloride SA (K-DUR;KLOR-CON M) 20 MEQ tablet TAKE 1 BY MOUTH AS DIRECTED EVERY DAY 90 tablet 3    spironolactone (ALDACTONE) 25 MG tablet TAKE 1 TABLET BY MOUTH EVERY DAY 90 tablet 3    Loratadine (CLARITIN) 10 MG CAPS Take by mouth daily       oxybutynin (DITROPAN-XL) 5 MG CR tablet Take 5 mg by mouth 3 times daily.  vitamin D (CHOLECALCIFEROL) 1000 UNIT TABS tablet Take 2,000 Units by mouth daily       atorvastatin (LIPITOR) 10 MG tablet Take 10 mg by mouth daily.  silver sulfADIAZINE (SILVADENE) 1 % cream Apply 1 applicator topically daily. Apply topically daily. No current facility-administered medications for this visit. Review of Systems:  Constitutional: no unanticipated weight loss. There's been no change in energy level, sleep pattern, or activity level. No fevers, chills. · Eyes: No visual changes or diplopia. No scleral icterus. · ENT: No Headaches, hearing loss or vertigo. No mouth sores or sore throat. · Cardiovascular: as reviewed in HPI  · Respiratory: No cough or wheezing, no sputum production. No hematemesis. · Gastrointestinal: No abdominal pain, appetite loss, blood in stools. No change in bowel or bladder habits. · Genitourinary: No dysuria, trouble voiding, or hematuria. · Musculoskeletal:  No gait disturbance, no joint complaints. · Integumentary: No rash or pruritis. · Neurological: No headache, diplopia, change in muscle strength, numbness or tingling. · Psychiatric: No anxiety or depression. · Endocrine: No temperature intolerance. No excessive thirst, fluid intake, or urination. No tremor. · Hematologic/Lymphatic: No abnormal bruising or bleeding, blood clots or swollen lymph nodes. · Allergic/Immunologic: No nasal congestion or hives.     Physical Exam:   /62   Pulse 64   Ht 4' 10\" (1.473 m)   Wt 113 lb (51.3 kg)   SpO2 96% BMI 23.62 kg/m²   Wt Readings from Last 3 Encounters:   09/16/21 113 lb (51.3 kg)   03/18/21 120 lb (54.4 kg)   09/25/20 116 lb (52.6 kg)     Constitutional: The patient is oriented to person, place, and time. Appears well-developed and well-nourished. In no acute distress. Head: Normocephalic and atraumatic. Pupils equal and round. Neck: Neck supple. No JVP or carotid bruit appreciated. No mass and no thyromegaly present. No lymphadenopathy present. Cardiovascular: Irregularly irregular. Normal heart sounds. Exam reveals no gallop and no friction rub. No murmur heard. Pulmonary/Chest: Effort normal and breath sounds normal. No respiratory distress. No wheezes, rhonchi or rales. Abdominal: Soft, non-tender. Bowel sounds are normal. Exhibits no organomegaly, mass or bruit. Extremities: No edema. No cyanosis or clubbing. Pulses are 2+ radial and carotid bilaterally. Neurological: No gross cranial nerve deficit. Coordination normal.   Skin: Skin is warm and dry. There is no rash or diaphoresis. Psychiatric: Patient has a normal mood and affect. Speech is normal and behavior is normal.     Lab Review:    [facility]  Lab Results   Component Value Date     04/20/2014    K 3.8 04/20/2014     04/20/2014    CO2 24 04/20/2014    BUN 13 04/20/2014    LABALBU 4.1 04/20/2014    CREATININE 0.9 04/20/2014    CALCIUM 9.5 04/20/2014    GFRAA >60 04/20/2014    LABGLOM >60 04/20/2014    GLUCOSE 98 04/20/2014       No results found for: TRIG, HDL, LDLCALC, LDLDIRECT, LABVLDL      EKG Interpretation: 8/23/19: Atrial fibrillation     9/25/2020: Atrial fibrillation     3/18/21: Atrial fibrillation with RSR(V1) -nondiagnostic. Nonspecific T-abnormality. 9/16/21: not completed.      Image Review:     PFT 7/2017  Overall Interpretation  No obstruction is present  No restriction is present  There is not a bronchodilator response present  Diffusion capacity is normal  FEV1 is 2.07 L at 110% predicted. FEV1/FVC ratio is 73  Normal study.  No evidence to suggest toxicity due to amiodarone    Echo 5/2010  LVEF 50-55  Mild mitral regurgitation  Mild mitral annujlar calcification  Mild tricuspid regurgitation with normal RVSP 30mmHg    Echo 7/2007  EF 25-30%    Assessment/Plan:     Atrial fibrillation   Physical exam remains irregularly irregular. She remains asymptomatic and has remained stable. Patient is on chronic anticoagulation therapy. INR managed by Dr. Hakan Diaz. She continues to continue her current treatment with Warfarin at this time. CBC 9/2021 stable [facility]. I had previously discussed with her the option of an ablation but she continues to refuse. Hypertension, essential   Controlled today. BMP from 9/2021 stable [facility]. Hyperlipidemia, unspecified   Stable with no reported myalgias. Last lipid profile from 9/18/2020 was [facility]. Continue Lipitor 10 mg daily. Congestive heart failure  Appears compensated on physical exam and denies denies shortness of breath, orthopnea, or PND. She has remained stable since our last visit. Continue ARB and B-blocker. On lasix therapy. NYHA class 2. She will follow up with Dr. Hakan Diaz with routine blood work. Instructed to obtain flu vaccine this season, annually and she has obtained COVID-19 vaccine. Follow up in 8 months. Thank you very much for allowing me to participate in the care of your patient. Please do not hesitate to contact me if you have any questions. Sincerely,  Alexandra Aiken MD    [address] [place] [address]  Ph: [phone]  Fax: [phone]    This note was scribed in the presence of Dr. Keerthi Huerta MD by Joyce Hein RN.

## 2021-09-16 ENCOUNTER — OFFICE VISIT (OUTPATIENT)
Dept: CARDIOLOGY CLINIC | Age: 71
End: 2021-09-16
Payer: MEDICARE

## 2021-09-16 VITALS
BODY MASS INDEX: 23.72 KG/M2 | SYSTOLIC BLOOD PRESSURE: 112 MMHG | DIASTOLIC BLOOD PRESSURE: 62 MMHG | HEART RATE: 64 BPM | HEIGHT: 58 IN | WEIGHT: 113 LBS | OXYGEN SATURATION: 96 %

## 2021-09-16 DIAGNOSIS — E78.5 HYPERLIPIDEMIA, UNSPECIFIED HYPERLIPIDEMIA TYPE: ICD-10-CM

## 2021-09-16 DIAGNOSIS — I10 ESSENTIAL HYPERTENSION: Primary | ICD-10-CM

## 2021-09-16 DIAGNOSIS — I50.22 CHRONIC SYSTOLIC CONGESTIVE HEART FAILURE (HCC): ICD-10-CM

## 2021-09-16 DIAGNOSIS — I48.0 PAF (PAROXYSMAL ATRIAL FIBRILLATION) (HCC): ICD-10-CM

## 2021-09-16 PROCEDURE — 99213 OFFICE O/P EST LOW 20 MIN: CPT | Performed by: INTERNAL MEDICINE

## 2024-01-25 NOTE — PROGRESS NOTES
Gastrointestinal: No abdominal pain, appetite loss, blood in stools. No change in bowel or bladder habits.  Genitourinary: No dysuria, trouble voiding, or hematuria.  Musculoskeletal:  No gait disturbance, no joint complaints.  Integumentary: No rash or pruritis.  Neurological: No headache, diplopia, change in muscle strength, numbness or tingling.   Psychiatric: No anxiety or depression.  Endocrine: No temperature intolerance. No excessive thirst, fluid intake, or urination. No tremor.  Hematologic/Lymphatic: No abnormal bruising or bleeding, blood clots or swollen lymph nodes.  Allergic/Immunologic: No nasal congestion or hives.    Physical Exam:   /66   Pulse 84   Ht 1.473 m (4' 9.99\")   Wt 58.1 kg (128 lb)   SpO2 97%   BMI 26.76 kg/m²   Wt Readings from Last 3 Encounters:   01/30/24 58.1 kg (128 lb)   09/16/21 51.3 kg (113 lb)   03/18/21 54.4 kg (120 lb)     Constitutional: The patient is oriented to person, place, and time. Appears well-developed and well-nourished. In no acute distress.   Head: Normocephalic and atraumatic. Pupils equal and round.  Neck: Neck supple. No JVP or carotid bruit appreciated. No mass and no thyromegaly present. No lymphadenopathy present.  Cardiovascular: Irregularly irregular. Normal heart sounds. Exam reveals no gallop and no friction rub. No murmur heard.  Pulmonary/Chest: Effort normal and breath sounds normal. No respiratory distress. No wheezes, rhonchi or rales.   Abdominal: Soft, non-tender. Bowel sounds are normal. Exhibits no organomegaly, mass or bruit.   Extremities: 2+ BLE edema with bilateral legs ACE wrap for sores from rash following with Dermatology. No cyanosis or clubbing. Pulses are 2+ radial and carotid bilaterally.  Neurological: No gross cranial nerve deficit. Coordination normal.   Skin: Skin is warm and dry. There is no rash or diaphoresis.   Psychiatric: Patient has a normal mood and affect. Speech is normal and behavior is normal.     Lab

## 2024-01-30 ENCOUNTER — OFFICE VISIT (OUTPATIENT)
Dept: CARDIOLOGY CLINIC | Age: 74
End: 2024-01-30
Payer: MEDICARE

## 2024-01-30 VITALS
HEART RATE: 84 BPM | WEIGHT: 128 LBS | SYSTOLIC BLOOD PRESSURE: 114 MMHG | OXYGEN SATURATION: 97 % | BODY MASS INDEX: 26.87 KG/M2 | DIASTOLIC BLOOD PRESSURE: 66 MMHG | HEIGHT: 58 IN

## 2024-01-30 DIAGNOSIS — E78.5 HYPERLIPIDEMIA, UNSPECIFIED HYPERLIPIDEMIA TYPE: ICD-10-CM

## 2024-01-30 DIAGNOSIS — I10 PRIMARY HYPERTENSION: ICD-10-CM

## 2024-01-30 DIAGNOSIS — R94.31 ABNORMAL ELECTROCARDIOGRAM (ECG) (EKG): ICD-10-CM

## 2024-01-30 DIAGNOSIS — I48.91 ATRIAL FIBRILLATION, UNSPECIFIED TYPE (HCC): Primary | ICD-10-CM

## 2024-01-30 DIAGNOSIS — Z86.79 HX OF HEART FAILURE: ICD-10-CM

## 2024-01-30 PROCEDURE — 99214 OFFICE O/P EST MOD 30 MIN: CPT | Performed by: INTERNAL MEDICINE

## 2024-01-30 PROCEDURE — 1123F ACP DISCUSS/DSCN MKR DOCD: CPT | Performed by: INTERNAL MEDICINE

## 2024-01-30 PROCEDURE — 3078F DIAST BP <80 MM HG: CPT | Performed by: INTERNAL MEDICINE

## 2024-01-30 PROCEDURE — 93000 ELECTROCARDIOGRAM COMPLETE: CPT | Performed by: INTERNAL MEDICINE

## 2024-01-30 PROCEDURE — 3074F SYST BP LT 130 MM HG: CPT | Performed by: INTERNAL MEDICINE

## 2024-01-30 RX ORDER — WARFARIN SODIUM 3 MG/1
TABLET ORAL DAILY
COMMUNITY

## 2024-03-11 ENCOUNTER — HOSPITAL ENCOUNTER (OUTPATIENT)
Dept: WOMENS IMAGING | Age: 74
Discharge: HOME | End: 2024-03-11
Payer: MEDICARE

## 2024-03-11 DIAGNOSIS — Z12.31 SCREENING MAMMOGRAM FOR BREAST CANCER: ICD-10-CM

## 2024-03-11 PROCEDURE — 77067 SCR MAMMO BI INCL CAD: CPT

## 2024-05-24 ENCOUNTER — HOSPITAL ENCOUNTER (OUTPATIENT)
Age: 74
End: 2024-05-24
Payer: MEDICARE

## 2024-05-24 VITALS
DIASTOLIC BLOOD PRESSURE: 66 MMHG | SYSTOLIC BLOOD PRESSURE: 114 MMHG | BODY MASS INDEX: 25.13 KG/M2 | HEIGHT: 60 IN | WEIGHT: 128 LBS

## 2024-05-24 DIAGNOSIS — I48.91 ATRIAL FIBRILLATION, UNSPECIFIED TYPE (HCC): ICD-10-CM

## 2024-05-24 DIAGNOSIS — R94.31 ABNORMAL ELECTROCARDIOGRAM (ECG) (EKG): ICD-10-CM

## 2024-05-24 DIAGNOSIS — Z86.79 HX OF HEART FAILURE: ICD-10-CM

## 2024-05-24 DIAGNOSIS — I10 PRIMARY HYPERTENSION: ICD-10-CM

## 2024-05-24 LAB
ECHO AO ASC DIAM: 2.9 CM
ECHO AO ASCENDING AORTA INDEX: 1.88 CM/M2
ECHO AO ROOT DIAM: 2.9 CM
ECHO AO ROOT INDEX: 1.88 CM/M2
ECHO AV AREA PEAK VELOCITY: 1.2 CM2
ECHO AV AREA VTI: 1.7 CM2
ECHO AV AREA/BSA PEAK VELOCITY: 0.8 CM2/M2
ECHO AV AREA/BSA VTI: 1.1 CM2/M2
ECHO AV MEAN GRADIENT: 2 MMHG
ECHO AV MEAN VELOCITY: 0.7 M/S
ECHO AV PEAK GRADIENT: 7 MMHG
ECHO AV PEAK VELOCITY: 1.3 M/S
ECHO AV VELOCITY RATIO: 0.46
ECHO AV VTI: 21.9 CM
ECHO BSA: 1.57 M2
ECHO EST RA PRESSURE: 10 MMHG
ECHO IVC PROX: 2 CM
ECHO LA AREA 2C: 20.1 CM2
ECHO LA AREA 4C: 27 CM2
ECHO LA MAJOR AXIS: 6.6 CM
ECHO LA MINOR AXIS: 5.9 CM
ECHO LA VOL BP: 75 ML (ref 22–52)
ECHO LA VOL MOD A2C: 57 ML (ref 22–52)
ECHO LA VOL MOD A4C: 88 ML (ref 22–52)
ECHO LA VOL/BSA BIPLANE: 49 ML/M2 (ref 16–34)
ECHO LA VOLUME INDEX MOD A2C: 37 ML/M2 (ref 16–34)
ECHO LA VOLUME INDEX MOD A4C: 57 ML/M2 (ref 16–34)
ECHO LV E' LATERAL VELOCITY: 18 CM/S
ECHO LV E' SEPTAL VELOCITY: 11 CM/S
ECHO LV FRACTIONAL SHORTENING: 18 % (ref 28–44)
ECHO LV INTERNAL DIMENSION DIASTOLE INDEX: 2.92 CM/M2
ECHO LV INTERNAL DIMENSION DIASTOLIC: 4.5 CM (ref 3.9–5.3)
ECHO LV INTERNAL DIMENSION SYSTOLIC INDEX: 2.4 CM/M2
ECHO LV INTERNAL DIMENSION SYSTOLIC: 3.7 CM
ECHO LV IVSD: 0.5 CM (ref 0.6–0.9)
ECHO LV MASS 2D: 63.2 G (ref 67–162)
ECHO LV MASS INDEX 2D: 41 G/M2 (ref 43–95)
ECHO LV POSTERIOR WALL DIASTOLIC: 0.5 CM (ref 0.6–0.9)
ECHO LV RELATIVE WALL THICKNESS RATIO: 0.22
ECHO LVOT AREA: 2.5 CM2
ECHO LVOT AV VTI INDEX: 0.69
ECHO LVOT DIAM: 1.8 CM
ECHO LVOT MEAN GRADIENT: 1 MMHG
ECHO LVOT PEAK GRADIENT: 2 MMHG
ECHO LVOT PEAK VELOCITY: 0.6 M/S
ECHO LVOT STROKE VOLUME INDEX: 24.9 ML/M2
ECHO LVOT SV: 38.4 ML
ECHO LVOT VTI: 15.1 CM
ECHO MV E DECELERATION TIME (DT): 178 MS
ECHO MV E VELOCITY: 1.09 M/S
ECHO MV E/E' LATERAL: 6.06
ECHO MV E/E' RATIO (AVERAGED): 7.98
ECHO MV E/E' SEPTAL: 9.91
ECHO PV MAX VELOCITY: 1.1 M/S
ECHO PV PEAK GRADIENT: 4 MMHG
ECHO RA AREA 4C: 17.3 CM2
ECHO RA END SYSTOLIC VOLUME APICAL 4 CHAMBER INDEX BSA: 29 ML/M2
ECHO RA VOLUME: 44 ML
ECHO RIGHT VENTRICULAR SYSTOLIC PRESSURE (RVSP): 27 MMHG
ECHO RV BASAL DIMENSION: 4 CM
ECHO RV FREE WALL PEAK S': 9 CM/S
ECHO RV LONGITUDINAL DIMENSION: 6.4 CM
ECHO RV MID DIMENSION: 3.2 CM
ECHO RV TAPSE: 2 CM (ref 1.7–?)
ECHO TV REGURGITANT MAX VELOCITY: 2.08 M/S
ECHO TV REGURGITANT PEAK GRADIENT: 17 MMHG

## 2024-05-24 PROCEDURE — 93306 TTE W/DOPPLER COMPLETE: CPT

## 2024-05-24 PROCEDURE — 93306 TTE W/DOPPLER COMPLETE: CPT | Performed by: INTERNAL MEDICINE

## 2024-10-15 NOTE — PROGRESS NOTES
[facility]      Cardiology Progress Note    Gretchen Rolle  1950 October 24, 2024    CC \"I'm good\"     HPI:  The patient is 74 y.o. female with a past medical history significant for atrial fibrillation, CHF, hypertension, and hyperlipidemia who presents for chronic management of atrial fibrillation.   9/16/2021 last follow up.She denied any cardiac sounding complaints. Patient denied exertional chest pain/pressure, dyspnea at rest, BATISTA, PND, orthopnea, palpitations, lightheadedness, weight changes, changes in LE edema, and syncope. She is [place]. Recent labs completed 9/10/21. The patient admits to medical therapy compliance and continues to tolerate. Her INR continues to be managed per Dr. Vickers. Today she presents for follow up in a wheelchair. She does use a cane to ambulate. She is accompanied by family. She states that overall she is feeling well. She denies any new sounding cardiac complaints. She denies any chest pains or worsening shortness of breath. She denies any palpitations or sensation of her heart racing. She reports medication compliance and is tolerating. She denies any abnormal bleeding or bruising. She denies exertional chest pain/pressure, dyspnea at rest, worsening BATISTA, PND, orthopnea, palpitations, lightheadedness, weight changes, changes in LE edema, and syncope.    Past Medical History:   Diagnosis Date    Atrial fibrillation (HCC)     Cardiomyopathy (HCC)     Hyperlipidemia     Hypertension     Osteoarthritis      Past Surgical History:   Procedure Laterality Date    CHOLECYSTECTOMY      HERNIA REPAIR       Family History   Problem Relation Age of Onset    High Blood Pressure Father      Social History     Tobacco Use    Smoking status: Never    Smokeless tobacco: Never   Substance Use Topics    Alcohol use: No    Drug use: No       Allergies   Allergen Reactions    Latex     Plasticized Base [Plastibase]     Adhesive Tape     Erythromycin     Merthiolate

## 2024-10-24 ENCOUNTER — OFFICE VISIT (OUTPATIENT)
Dept: CARDIOLOGY CLINIC | Age: 74
End: 2024-10-24
Payer: MEDICARE

## 2024-10-24 VITALS
BODY MASS INDEX: 28.29 KG/M2 | DIASTOLIC BLOOD PRESSURE: 66 MMHG | SYSTOLIC BLOOD PRESSURE: 116 MMHG | OXYGEN SATURATION: 96 % | HEART RATE: 76 BPM | HEIGHT: 60 IN | WEIGHT: 144.1 LBS

## 2024-10-24 DIAGNOSIS — I50.22 CHRONIC SYSTOLIC HEART FAILURE (HCC): Primary | ICD-10-CM

## 2024-10-24 DIAGNOSIS — I10 ESSENTIAL HYPERTENSION: ICD-10-CM

## 2024-10-24 DIAGNOSIS — I48.20 CHRONIC ATRIAL FIBRILLATION (HCC): ICD-10-CM

## 2024-10-24 DIAGNOSIS — E78.2 MIXED HYPERLIPIDEMIA: ICD-10-CM

## 2024-10-24 DIAGNOSIS — I34.0 NONRHEUMATIC MITRAL VALVE REGURGITATION: ICD-10-CM

## 2024-10-24 PROCEDURE — 93000 ELECTROCARDIOGRAM COMPLETE: CPT | Performed by: INTERNAL MEDICINE

## 2024-10-24 PROCEDURE — 3078F DIAST BP <80 MM HG: CPT | Performed by: INTERNAL MEDICINE

## 2024-10-24 PROCEDURE — 3074F SYST BP LT 130 MM HG: CPT | Performed by: INTERNAL MEDICINE

## 2024-10-24 PROCEDURE — 1159F MED LIST DOCD IN RCRD: CPT | Performed by: INTERNAL MEDICINE

## 2024-10-24 PROCEDURE — 1123F ACP DISCUSS/DSCN MKR DOCD: CPT | Performed by: INTERNAL MEDICINE

## 2024-10-24 PROCEDURE — 99214 OFFICE O/P EST MOD 30 MIN: CPT | Performed by: INTERNAL MEDICINE
